# Patient Record
Sex: MALE | Race: WHITE | URBAN - METROPOLITAN AREA
[De-identification: names, ages, dates, MRNs, and addresses within clinical notes are randomized per-mention and may not be internally consistent; named-entity substitution may affect disease eponyms.]

---

## 2017-04-22 ENCOUNTER — HOSPITAL ENCOUNTER (EMERGENCY)
Facility: CLINIC | Age: 32
Discharge: HOME | End: 2017-04-22
Attending: EMERGENCY MEDICINE | Admitting: EMERGENCY MEDICINE
Payer: OTHER MISCELLANEOUS

## 2017-04-22 VITALS
OXYGEN SATURATION: 98 % | DIASTOLIC BLOOD PRESSURE: 92 MMHG | RESPIRATION RATE: 16 BRPM | TEMPERATURE: 97.7 F | SYSTOLIC BLOOD PRESSURE: 137 MMHG

## 2017-04-22 DIAGNOSIS — S61.511A LACERATION OF RIGHT WRIST, INITIAL ENCOUNTER: ICD-10-CM

## 2017-04-22 PROCEDURE — 99283 EMERGENCY DEPT VISIT LOW MDM: CPT | Mod: 25

## 2017-04-22 PROCEDURE — 90715 TDAP VACCINE 7 YRS/> IM: CPT | Performed by: EMERGENCY MEDICINE

## 2017-04-22 PROCEDURE — 90471 IMMUNIZATION ADMIN: CPT

## 2017-04-22 PROCEDURE — 25000125 ZZHC RX 250: Performed by: EMERGENCY MEDICINE

## 2017-04-22 RX ADMIN — CLOSTRIDIUM TETANI TOXOID ANTIGEN (FORMALDEHYDE INACTIVATED), CORYNEBACTERIUM DIPHTHERIAE TOXOID ANTIGEN (FORMALDEHYDE INACTIVATED), BORDETELLA PERTUSSIS TOXOID ANTIGEN (GLUTARALDEHYDE INACTIVATED), BORDETELLA PERTUSSIS FILAMENTOUS HEMAGGLUTININ ANTIGEN (FORMALDEHYDE INACTIVATED), BORDETELLA PERTUSSIS PERTACTIN ANTIGEN, AND BORDETELLA PERTUSSIS FIMBRIAE 2/3 ANTIGEN 0.5 ML: 5; 2; 2.5; 5; 3; 5 INJECTION, SUSPENSION INTRAMUSCULAR at 08:42

## 2017-04-22 ASSESSMENT — ENCOUNTER SYMPTOMS: WOUND: 1

## 2017-04-22 NOTE — ED AVS SNAPSHOT
Emergency Department    [address] [place] [address]    Phone:  [phone]    Fax:  [phone]                                       Armando Patel   MRN: [id number]    Department:   Emergency Department   Date of Visit:  4/22/2017           After Visit Summary Signature Page     I have received my discharge instructions, and my questions have been answered. I have discussed any challenges I see with this plan with the nurse or doctor.    ..........................................................................................................................................  Patient/Patient Representative Signature      ..........................................................................................................................................  Patient Representative Print Name and Relationship to Patient    ..................................................               ................................................  Date                                            Time    ..........................................................................................................................................  Reviewed by Signature/Title    ...................................................              ..............................................  Date                                                            Time

## 2017-04-22 NOTE — ED PROVIDER NOTES
History     Chief Complaint:  Laceration    HPI   Armando Patel is a 31 year old male who presents for evaluation of a laceration. The patient sliced his right lower forearm on a piece of glass at work at [employer] about 30 minutes prior to arrival. The patient's last Tetanus shot was in 2011.    Allergies:  No known drug allergies    Medications:    Plaquenil    Past Medical History:    Systemic lupus  Raynaud's    Past Surgical History:    The patient does not have any pertinent past surgical history.    Family History:    No past pertinent family history.    Social History:  The patient was in the ED alone.  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   All other systems reviewed and are negative.    Physical Exam   First Vitals:  BP: 137/92  Temp: 97.7 (36.5C) Oral  HR: 67  Resp 16  SpO2: 98%       Physical Exam  Nursing note and vitals reviewed.  Constitutional:  Appears well-developed and well-nourished.   Cardiovascular:  Normal rate, regular rhythm, no murmur   Pulmonary/Chest: Breath sounds are clear and equal without wheezes or crackles.  Musculoskeletal:  Right arm: 2cm linear laceration to the dorsum of the right wrist proximal to the wrist crease. There is no deep structure involvement. The laceration extends just to the subcutaneous tissue, but there is no visible tendon laceration and no exposed tendons. No active bleeding. Good flexion and extension of the wrist. Normal ROM of the fingers and thumb. Normal sensation distally to the fingers and hand. Good radial and ulnar pulses.    Emergency Department Course     Procedures:    Narrative: Procedure: Laceration Repair        LACERATION:  A simple clean 2cm laceration.      LOCATION:  Dorsum of the right wrist proximal to the wrist crease      FUNCTION:  Distally sensation are intact.      ANESTHESIA:  1% lido with epi 3ccs      PREPARATION:   Irrigation with Betadine      DEBRIDEMENT:  no debridement      CLOSURE:  Wound was closed with 4 simple interrupted 5.0 Nylon sutures    Interventions:  0840 Tdap vaccination 0.5mL    Emergency Department Course:  Nursing notes and vitals reviewed.  I performed an exam of the patient as documented above.     I repaired the patient's laceration per the procedure note above. He was also updated on his Tetanus vaccination.    Findings and plan explained to the patient. Patient discharged home with instructions regarding supportive care, medications, and reasons to return. The importance of close follow-up was reviewed.     Impression & Plan    Medical Decision Making:  Armando Patel is a 31 year old male who presented with a laceration on his right lower forearm, which he incurred while working at [employer] this morning.  The wound was carefully evaluated and explored.  The laceration was closed with sutures as noted above.  There is no evidence of muscular, tendon, or bony damage with this laceration.  No signs of foreign body.  Possible complications (infection, scarring) were reviewed with the patient.  Follow up with primary care will be indicated for suture removal in 12 days as noted in the discharge section.    Diagnosis:  (S61.511A) Laceration of right wrist, initial encounter    Disposition:  The patient was discharged home.    4/22/2017    EMERGENCY DEPARTMENT    I, Ledy West, am serving as a scribe at 0829 on April 22, 2017  to document services personally performed by Dr. Bates, based on my observations and the provider's statements to me.       Marina Bates MD  04/22/17 2165

## 2017-04-22 NOTE — ED AVS SNAPSHOT
Emergency Department    [address] [facility] [address]    Phone:  [phone]    Fax:  [phone]                                       Armando Patel   MRN: [id number]    Department:   Emergency Department   Date of Visit:  4/22/2017           Patient Information     Date Of Birth          1985        Your diagnoses for this visit were:     Laceration of right wrist, initial encounter        You were seen by Marina Bates MD.      Follow-up Information     Follow up with  Emergency Department.    Specialty:  EMERGENCY MEDICINE    Why:  or follow-up in your Primary clinic for removal of stitches in 12 days (11-13 days).  Follow-up sooner as needed for any signs of wound infection.    Contact information:    [address] [facility] [address] [phone]        Discharge Instructions       Have stitches removed in 12 days.    Discharge References/Attachments     LACERATION, EXTREMITY: SUTURE, STAPLE, OR TAPE (ENGLISH)      24 Hour Appointment Hotline       To make an appointment at any [facility] clinic, call [phone] ([phone]). If you don't have a family doctor or clinic, we will help you find one. [facility] clinics are conveniently located to serve the needs of you and your family.             Review of your medicines      Notice     You have not been prescribed any medications.            Orders Needing Specimen Collection     None      Pending Results     No orders found from 4/20/2017 to 4/23/2017.            Pending Culture Results     No orders found from 4/20/2017 to 4/23/2017.            Test Results From Your Hospital Stay               Clinical Quality Measure: Blood Pressure Screening     Your blood pressure was checked while you were in the emergency department today. The last reading we obtained was  BP: (!) 137/92 . Please read the guidelines below about what these numbers mean and what you should do about them.  If your systolic  "blood pressure (the top number) is less than 120 and your diastolic blood pressure (the bottom number) is less than 80, then your blood pressure is normal. There is nothing more that you need to do about it.  If your systolic blood pressure (the top number) is 120-139 or your diastolic blood pressure (the bottom number) is 80-89, your blood pressure may be higher than it should be. You should have your blood pressure rechecked within a year by a primary care provider.  If your systolic blood pressure (the top number) is 140 or greater or your diastolic blood pressure (the bottom number) is 90 or greater, you may have high blood pressure. High blood pressure is treatable, but if left untreated over time it can put you at risk for heart attack, stroke, or kidney failure. You should have your blood pressure rechecked by a primary care provider within the next 4 weeks.  If your provider in the emergency department today gave you specific instructions to follow-up with your doctor or provider even sooner than that, you should follow that instruction and not wait for up to 4 weeks for your follow-up visit.        Thank you for choosing [facility]       Thank you for choosing [facility] for your care. Our goal is always to provide you with excellent care. Hearing back from our patients is one way we can continue to improve our services. Please take a few minutes to complete the written survey that you may receive in the mail after you visit with us. Thank you!        [employer] Information     [facility] lets you send messages to your doctor, view your test results, renew your prescriptions, schedule appointments and more. To sign up, go to [email or web address] . Click on \"Log in\" on the left side of the screen, which will take you to the Welcome page. Then click on \"Sign up Now\" on the right side of the page.     You will be asked to enter the access code listed below, as well as some personal information. Please follow the " directions to create your username and password.     Your access code is: [email or web address]  Expires: 2017  9:06 AM     Your access code will  in 90 days. If you need help or a new code, please call your [facility] clinic or [phone].        Care EveryWhere ID     This is your Care EveryWhere ID. This could be used by other organizations to access your [facility] medical records  [id number]        After Visit Summary       This is your record. Keep this with you and show to your community pharmacist(s) and doctor(s) at your next visit.

## 2018-01-05 ENCOUNTER — HOSPITAL ENCOUNTER (EMERGENCY)
Facility: CLINIC | Age: 33
Discharge: HOME | End: 2018-01-05
Attending: EMERGENCY MEDICINE | Admitting: EMERGENCY MEDICINE
Payer: COMMERCIAL

## 2018-01-05 VITALS
TEMPERATURE: 98.1 F | RESPIRATION RATE: 16 BRPM | OXYGEN SATURATION: 98 % | SYSTOLIC BLOOD PRESSURE: 134 MMHG | DIASTOLIC BLOOD PRESSURE: 90 MMHG | WEIGHT: 190 LBS

## 2018-01-05 DIAGNOSIS — R55 VASOVAGAL SYNCOPE: ICD-10-CM

## 2018-01-05 LAB
ANION GAP SERPL CALCULATED.3IONS-SCNC: 8 MMOL/L (ref 3–14)
BASOPHILS # BLD AUTO: 0 10E9/L (ref 0–0.2)
BASOPHILS NFR BLD AUTO: 0.4 %
BUN SERPL-MCNC: 12 MG/DL (ref 7–30)
CALCIUM SERPL-MCNC: 9.1 MG/DL (ref 8.5–10.1)
CHLORIDE SERPL-SCNC: 106 MMOL/L (ref 94–109)
CO2 SERPL-SCNC: 27 MMOL/L (ref 20–32)
CREAT SERPL-MCNC: 0.89 MG/DL (ref 0.66–1.25)
DIFFERENTIAL METHOD BLD: ABNORMAL
EOSINOPHIL # BLD AUTO: 0 10E9/L (ref 0–0.7)
EOSINOPHIL NFR BLD AUTO: 1.2 %
ERYTHROCYTE [DISTWIDTH] IN BLOOD BY AUTOMATED COUNT: 12 % (ref 10–15)
GFR SERPL CREATININE-BSD FRML MDRD: >90 ML/MIN/1.7M2
GLUCOSE SERPL-MCNC: 84 MG/DL (ref 70–99)
HCT VFR BLD AUTO: 43.9 % (ref 40–53)
HGB BLD-MCNC: 15.1 G/DL (ref 13.3–17.7)
IMM GRANULOCYTES # BLD: 0 10E9/L (ref 0–0.4)
IMM GRANULOCYTES NFR BLD: 0.4 %
INTERPRETATION ECG - MUSE: NORMAL
LYMPHOCYTES # BLD AUTO: 0.7 10E9/L (ref 0.8–5.3)
LYMPHOCYTES NFR BLD AUTO: 26.2 %
MCH RBC QN AUTO: 31.7 PG (ref 26.5–33)
MCHC RBC AUTO-ENTMCNC: 34.4 G/DL (ref 31.5–36.5)
MCV RBC AUTO: 92 FL (ref 78–100)
MONOCYTES # BLD AUTO: 0.3 10E9/L (ref 0–1.3)
MONOCYTES NFR BLD AUTO: 10.1 %
NEUTROPHILS # BLD AUTO: 1.5 10E9/L (ref 1.6–8.3)
NEUTROPHILS NFR BLD AUTO: 61.7 %
NRBC # BLD AUTO: 0 10*3/UL
NRBC BLD AUTO-RTO: 0 /100
PLATELET # BLD AUTO: 173 10E9/L (ref 150–450)
POTASSIUM SERPL-SCNC: 3.8 MMOL/L (ref 3.4–5.3)
RBC # BLD AUTO: 4.76 10E12/L (ref 4.4–5.9)
SODIUM SERPL-SCNC: 141 MMOL/L (ref 133–144)
WBC # BLD AUTO: 2.5 10E9/L (ref 4–11)

## 2018-01-05 PROCEDURE — 93005 ELECTROCARDIOGRAM TRACING: CPT

## 2018-01-05 PROCEDURE — 99284 EMERGENCY DEPT VISIT MOD MDM: CPT

## 2018-01-05 PROCEDURE — 80048 BASIC METABOLIC PNL TOTAL CA: CPT | Performed by: EMERGENCY MEDICINE

## 2018-01-05 PROCEDURE — 85025 COMPLETE CBC W/AUTO DIFF WBC: CPT | Performed by: EMERGENCY MEDICINE

## 2018-01-05 RX ORDER — LIDOCAINE 40 MG/G
CREAM TOPICAL
Status: DISCONTINUED | OUTPATIENT
Start: 2018-01-05 | End: 2018-01-05 | Stop reason: HOSPADM

## 2018-01-05 ASSESSMENT — ENCOUNTER SYMPTOMS
COUGH: 0
DIAPHORESIS: 1
VOMITING: 0
LIGHT-HEADEDNESS: 1
DIARRHEA: 0
NAUSEA: 1
FEVER: 0

## 2018-01-05 NOTE — ED AVS SNAPSHOT
[facility] Emergency Department    [address] [place] [address]    Phone:  [phone]    Fax:  [phone]                                       Armando Patel   MRN: [id number]    Department:  [facility] Emergency Department   Date of Visit:  1/5/2018           Patient Information     Date Of Birth          1985        Your diagnoses for this visit were:     Vasovagal syncope        You were seen by Jatinder Mittal MD.      Follow-up Information     Follow up with Kristopher Albright DO.    Specialty:  Family Practice    Why:  As needed, for re-evaluation of your symptoms    Contact information:    [facility]  [address]  [facility] [address] [phone]          Discharge Instructions       Discharge Instructions  Syncope    Syncope (fainting) is a sudden, short loss of consciousness (passing out spell). People will usually fall to the ground when they faint or slump over if seated.  People may also shake when this happens, and it can sometimes be difficult to tell the difference between syncope and a seizure. At this time, your provider does not find a reason to suspect that your fainting spell is a sign of anything dangerous or life-threatening.  However, sometimes the signs of serious illness do not show up right away.     Generally, every Emergency Department visit should have a follow-up clinic visit with either a primary or a specialty clinic/provider. Please follow-up as instructed by your emergency provider today.    Return to the Emergency Department if:    You faint again.     You have any significant bleeding.    You have chest pain or a fast or irregular heartbeat.    You feel short of breath.    You cough up any blood.    You have abdominal (belly) pain or unusual back pain.    You have ongoing vomiting (throwing up) or diarrhea (loose stools).    You have a black or tarry bowel movement, or blood in the  stool or in your vomit.    You have a fever over 101 F.    You lose feeling or cannot move a part of your body or cannot talk normally.    You are confused, have a headache, cannot see well, or have a seizure.    DO NOT DRIVE. CALL 911 INSTEAD!    What can I do to help myself?    Follow any specific instructions that your provider discussed with you.    If you feel light-headed, make sure to sit down right away, even if you have to sit on the floor.    Follow up with your regular medical provider as discussed for further management. This may include lowering your blood pressure medications, insulin or other diabetic medications, checking your blood sugar more frequently, and drinking more fluids, taking medicines for vomiting or diarrhea or getting up slower.  If you were given a prescription for medicine here today, be sure to read all of the information (including the package insert) that comes with your prescription.  This will include important information about the medicine, its side effects, and any warnings that you need to know about.  The pharmacist who fills the prescription can provide more information and answer questions you may have about the medicine.  If you have questions or concerns that the pharmacist cannot address, please call or return to the Emergency Department.   Remember that you can always come back to the Emergency Department if you are not able to see your regular provider in the amount of time listed above, if you get any new symptoms, or if there is anything that worries you.      24 Hour Appointment Hotline       To make an appointment at any [facility], call [phone] ([phone]). If you don't have a family doctor or clinic, we will help you find one. [facility] are conveniently located to serve the needs of you and your family.             Review of your medicines      Notice     You have not been prescribed any medications.            Procedures and tests  performed during your visit     Basic metabolic panel    CBC with platelets differential    Cardiac Continuous Monitoring    Orthostatic blood pressure and pulse    Peripheral IV catheter      Orders Needing Specimen Collection     None      Pending Results     No orders found from 1/3/2018 to 1/6/2018.            Pending Culture Results     No orders found from 1/3/2018 to 1/6/2018.            Pending Results Instructions     If you had any lab results that were not finalized at the time of your Discharge, you can call the ED Lab Result RN at [phone]. You will be contacted by this team for any positive Lab results or changes in treatment. The nurses are available 7 days a week from 10A to 6:30P.  You can leave a message 24 hours per day and they will return your call.        Test Results From Your Hospital Stay        1/5/2018  9:59 AM      Component Results     Component Value Ref Range & Units Status    WBC 2.5 (L) 4.0 - 11.0 10e9/L Final    RBC Count 4.76 4.4 - 5.9 10e12/L Final    Hemoglobin 15.1 13.3 - 17.7 g/dL Final    Hematocrit 43.9 40.0 - 53.0 % Final    MCV 92 78 - 100 fl Final    MCH 31.7 26.5 - 33.0 pg Final    MCHC 34.4 31.5 - 36.5 g/dL Final    RDW 12.0 10.0 - 15.0 % Final    Platelet Count 173 150 - 450 10e9/L Final    Diff Method Automated Method  Final    % Neutrophils 61.7 % Final    % Lymphocytes 26.2 % Final    % Monocytes 10.1 % Final    % Eosinophils 1.2 % Final    % Basophils 0.4 % Final    % Immature Granulocytes 0.4 % Final    Nucleated RBCs 0 0 /100 Final    Absolute Neutrophil 1.5 (L) 1.6 - 8.3 10e9/L Final    Absolute Lymphocytes 0.7 (L) 0.8 - 5.3 10e9/L Final    Absolute Monocytes 0.3 0.0 - 1.3 10e9/L Final    Absolute Eosinophils 0.0 0.0 - 0.7 10e9/L Final    Absolute Basophils 0.0 0.0 - 0.2 10e9/L Final    Abs Immature Granulocytes 0.0 0 - 0.4 10e9/L Final    Absolute Nucleated RBC 0.0  Final         1/5/2018 10:12 AM      Component Results     Component Value Ref Range & Units  Status    Sodium 141 133 - 144 mmol/L Final    Potassium 3.8 3.4 - 5.3 mmol/L Final    Chloride 106 94 - 109 mmol/L Final    Carbon Dioxide 27 20 - 32 mmol/L Final    Anion Gap 8 3 - 14 mmol/L Final    Glucose 84 70 - 99 mg/dL Final    Urea Nitrogen 12 7 - 30 mg/dL Final    Creatinine 0.89 0.66 - 1.25 mg/dL Final    GFR Estimate >90 >60 mL/min/1.7m2 Final    Non  GFR Calc    GFR Estimate If Black >90 >60 mL/min/1.7m2 Final    African American GFR Calc    Calcium 9.1 8.5 - 10.1 mg/dL Final                Clinical Quality Measure: Blood Pressure Screening     Your blood pressure was checked while you were in the emergency department today. The last reading we obtained was  BP: 134/90 . Please read the guidelines below about what these numbers mean and what you should do about them.  If your systolic blood pressure (the top number) is less than 120 and your diastolic blood pressure (the bottom number) is less than 80, then your blood pressure is normal. There is nothing more that you need to do about it.  If your systolic blood pressure (the top number) is 120-139 or your diastolic blood pressure (the bottom number) is 80-89, your blood pressure may be higher than it should be. You should have your blood pressure rechecked within a year by a primary care provider.  If your systolic blood pressure (the top number) is 140 or greater or your diastolic blood pressure (the bottom number) is 90 or greater, you may have high blood pressure. High blood pressure is treatable, but if left untreated over time it can put you at risk for heart attack, stroke, or kidney failure. You should have your blood pressure rechecked by a primary care provider within the next 4 weeks.  If your provider in the emergency department today gave you specific instructions to follow-up with your doctor or provider even sooner than that, you should follow that instruction and not wait for up to 4 weeks for your follow-up visit.       "  Thank you for choosing [facility]       Thank you for choosing [facility] for your care. Our goal is always to provide you with excellent care. Hearing back from our patients is one way we can continue to improve our services. Please take a few minutes to complete the written survey that you may receive in the mail after you visit with us. Thank you!        [employer] Information     [facility] lets you send messages to your doctor, view your test results, renew your prescriptions, schedule appointments and more. To sign up, go to [email or web address] . Click on \"Log in\" on the left side of the screen, which will take you to the Welcome page. Then click on \"Sign up Now\" on the right side of the page.     You will be asked to enter the access code listed below, as well as some personal information. Please follow the directions to create your username and password.     Your access code is: [email or web address]  Expires: 2018 10:44 AM     Your access code will  in 90 days. If you need help or a new code, please call your [facility] clinic or [phone].        Care EveryWhere ID     This is your Care EveryWhere ID. This could be used by other organizations to access your [facility] medical records  [id number]        Equal Access to Services     TAD HERNANDEZ : Hadsharmila Vera, waaxda luqadaha, qaybta kaalmada adejoseyada, kayden merrill. So [facility] [phone].    ATENCIÓN: Si habla español, tiene a cooper disposición servicios gratuitos de asistencia lingüística. Llame al [phone].    We comply with applicable federal civil rights laws and [place] laws. We do not discriminate on the basis of race, color, national origin, age, disability, sex, sexual orientation, or gender identity.            After Visit Summary       This is your record. Keep this with you and show to your community pharmacist(s) and doctor(s) at your next visit.                  "

## 2018-01-05 NOTE — ED NOTES
Pt reports that last night he stood up from the couch, felt light headed, so he got down on all 4's to try and get blood flow to his head. Pt is unsure how long he lost consciousness for. When pt woke up he was not wearing a shirt, was laying on his back, and had urinated. Pt lives alone. Denies being sick lately. ABC intact. A/o x4.

## 2018-01-05 NOTE — ED PROVIDER NOTES
History     Chief Complaint:  Loss of Consciousness    HPI   Armando Patel is a 32 year old male, with a history of systemic lupus, who presents to the ED for evaluation of loss of consciousness. The patient reports he gets lightheaded and dizzy whenever he stands up quickly. The patient notes he became lightheaded at 9:30PM last night and had a syncopal episode. The patient reports he remembers being on his hands and knees then waking up on his back with his shirt off and urinary incontinence about 10 steps from his original location. The patient notes he had pallor and was diaphoretic with nausea after the syncope. While in the ED, the patient reports he still feels unwell. The patient reports he normally has abdominal discomfort with abnormal bowel movements, but had 30 minutes of continual solid bowel movements yesterday. The patient denies any fevers, cough, vomiting, or diarrhea.    Allergies:  No known drug allergies    Medications:    The patient is not currently taking any prescribed medications.    Past Medical History:    Systemic lupus  Raynaud's  Celiac disease     Past Surgical History:    Bursa sac rupture repair     Family History:    Lupus     Social History:  Smoking status: Never smoker  Substance use: Marijuana   Alcohol use: Socially   Presents to ED alone   Marital Status:  Single [1]     Review of Systems   Constitutional: Positive for diaphoresis. Negative for fever.   Respiratory: Negative for cough.    Gastrointestinal: Positive for nausea. Negative for diarrhea and vomiting.   Skin: Positive for pallor.   Neurological: Positive for syncope and light-headedness.   All other systems reviewed and are negative.    Physical Exam     Patient Vitals for the past 24 hrs:   BP Temp Temp src Heart Rate Resp SpO2 Weight   01/05/18 0953 - 98.1  F (36.7  C) Oral - - - -   01/05/18 0927 134/90 - - - - - -   01/05/18 0921 - - - 78 16 98 % 86.2 kg (190 lb)     Physical Exam  General: Patient is  alert and cooperative.  HENT:  Normal nose, oropharynx. Moist oral mucosa.  Eyes: EOMI. Normal conjunctiva.  Neck:  Normal range of motion and appearance.   Cardiovascular:  Normal rate, regular rhythm and normal heart sounds.   Pulmonary/Chest:  Effort normal. No wheezing or crackles.  Abdominal: Soft. No distension or tenderness.     Musculoskeletal: Normal range of motion. No edema or tenderness.   Neurological: oriented, normal strength, sensation, and coordination.   Skin: Warm and dry. No rash or bruising.   Psychiatric: Normal mood and affect. Normal behavior and judgement.      Emergency Department Course     ECG (9:18:05):  Rate 70 bpm. WY interval 160. QRS duration 90. QT/QTc 380/410. P-R-T axes 19 63 36. Normal sinus rhythm. Normal ECG. Interpreted at 0920 by Jatinder Mittal MD.    Laboratory:  CBC: WBC 2.5(L) o/w WNL (HGB 15.1, )  BMP: o/w WNL (Creatinine 0.89)    Emergency Department Course:  Past medical records, nursing notes, and vitals reviewed.  0927: I performed an exam of the patient and obtained history, as documented above.  IV inserted and blood drawn.    1026: I rechecked the patient. Findings and plan explained to the Patient. Patient discharged home with instructions regarding supportive care, medications, and reasons to return. The importance of close follow-up was reviewed.     Impression & Plan      Medical Decision Making:  Armando Patel is a 32 year old male who presents with a history and clinical exam consistent with syncope.  While vasovagal episode was the most likely etiology given the history of this patient, I considered a broad differential for their syncope today including cardiac arrhythmia, ACS, aortic stenosis, HOCM, PE, orthostatic hypotension, drugs, situational, carotid hypersensitivity, seizure, TIA, stroke, vasovagal.   He has no signs of a concerning etiology for syncope at this point.  In addition,he has no family history of sudden death, no chest  pain, no seizure activity or post-ictal period, no murmur, and no signs of orthostasis in the ED, no focal neurologic symptoms, and no complaints of concerning headache.  The workup in the ED is negative and the physical exam is re-assurring.  Supportive outpatient management is therefore indicated.      Diagnosis:    ICD-10-CM   1. Vasovagal syncope R55     Disposition: Patient discharged to home     Vania Lay  1/5/2018   [facility] EMERGENCY DEPARTMENT    I, Vania Lay, am serving as a scribe at 9:27 AM on 1/5/2018 to document services personally performed by Jatinder Mittal MD based on my observations and the provider's statements to me.        Jatinder Mittal MD  01/08/18 0732

## 2018-01-11 ENCOUNTER — NURSE TRIAGE (OUTPATIENT)
Dept: NURSING | Facility: CLINIC | Age: 33
End: 2018-01-11

## 2018-01-11 NOTE — TELEPHONE ENCOUNTER
Reason for Disposition    [1] Knocked out (unconscious) > 1 minute AND [2] no head CT Scan or MRI has been performed    Additional Information    Negative: [1] Black eyes on both sides AND [2] onset within 24 hours of head injury    Negative: Weakness (i.e., paralysis, loss of muscle strength) of the face, arm or leg on one side of the body    Negative: Loss of speech or garbled speech    Negative: Difficult to awaken or acting confused  (e.g., disoriented, slurred speech)    Negative: Sounds like a life-threatening emergency to the triager    Negative: [1] Vomiting once or more AND [2] no head CT Scan or MRI has been performed    Negative: [1] Unsteady on feet (e.g., unable to stand or requires support to walk) AND [2] no head CT Scan or MRI has been performed    Negative: Concussion symptoms are worsening    Protocols used: CONCUSSION ([facility]) LESS THAN 14 DAYS AGO FOLLOW-UP CALL-[facility]  Patient fainted and woke with head injury.  Not sure how long he was unconscious or if hit on head contributed.  Prerna Blankenship RN  [facility]

## 2019-09-29 ENCOUNTER — HEALTH MAINTENANCE LETTER (OUTPATIENT)
Age: 34
End: 2019-09-29

## 2019-10-28 ENCOUNTER — HEALTH MAINTENANCE LETTER (OUTPATIENT)
Age: 34
End: 2019-10-28

## 2021-01-14 ENCOUNTER — HEALTH MAINTENANCE LETTER (OUTPATIENT)
Age: 36
End: 2021-01-14

## 2021-10-24 ENCOUNTER — HEALTH MAINTENANCE LETTER (OUTPATIENT)
Age: 36
End: 2021-10-24

## 2022-02-13 ENCOUNTER — HEALTH MAINTENANCE LETTER (OUTPATIENT)
Age: 37
End: 2022-02-13

## 2022-10-15 ENCOUNTER — HEALTH MAINTENANCE LETTER (OUTPATIENT)
Age: 37
End: 2022-10-15

## 2023-03-26 ENCOUNTER — HEALTH MAINTENANCE LETTER (OUTPATIENT)
Age: 38
End: 2023-03-26
